# Patient Record
Sex: FEMALE | Race: WHITE | Employment: OTHER | URBAN - NONMETROPOLITAN AREA
[De-identification: names, ages, dates, MRNs, and addresses within clinical notes are randomized per-mention and may not be internally consistent; named-entity substitution may affect disease eponyms.]

---

## 2017-04-27 ENCOUNTER — HOSPITAL ENCOUNTER (EMERGENCY)
Age: 77
Discharge: HOME | End: 2017-04-27
Attending: EMERGENCY MEDICINE
Payer: MEDICARE

## 2017-04-27 ENCOUNTER — APPOINTMENT (OUTPATIENT)
Dept: GENERAL RADIOLOGY | Age: 77
End: 2017-04-27
Payer: MEDICARE

## 2017-04-27 VITALS
TEMPERATURE: 98.6 F | RESPIRATION RATE: 16 BRPM | DIASTOLIC BLOOD PRESSURE: 73 MMHG | SYSTOLIC BLOOD PRESSURE: 154 MMHG | OXYGEN SATURATION: 97 % | HEART RATE: 73 BPM

## 2017-04-27 DIAGNOSIS — S90.32XA CONTUSION OF LEFT FOOT, INITIAL ENCOUNTER: Primary | ICD-10-CM

## 2017-04-27 DIAGNOSIS — Z87.39 HISTORY OF GOUT: ICD-10-CM

## 2017-04-27 PROCEDURE — 99283 EMERGENCY DEPT VISIT LOW MDM: CPT

## 2017-04-27 PROCEDURE — 73630 X-RAY EXAM OF FOOT: CPT

## 2017-04-27 RX ORDER — PREDNISONE 20 MG/1
TABLET ORAL
Qty: 10 TABLET | Refills: 0 | Status: SHIPPED | OUTPATIENT
Start: 2017-04-27

## 2017-04-27 ASSESSMENT — PAIN SCALES - GENERAL
PAINLEVEL_OUTOF10: 9
PAINLEVEL_OUTOF10: 9

## 2017-04-27 ASSESSMENT — PAIN DESCRIPTION - ORIENTATION: ORIENTATION: LEFT

## 2017-04-27 ASSESSMENT — PAIN DESCRIPTION - LOCATION: LOCATION: FOOT

## 2017-05-01 ENCOUNTER — HOSPITAL ENCOUNTER (OUTPATIENT)
Age: 77
Discharge: HOME | End: 2017-05-01
Payer: MEDICARE

## 2017-05-01 LAB
SEDIMENTATION RATE, ERYTHROCYTE: 39 MM (ref 0–30)
URIC ACID: 7.5 MG/DL (ref 2.4–5.7)
WBC # BLD: 5.7 K/UL (ref 3.5–11)

## 2017-05-01 PROCEDURE — 84550 ASSAY OF BLOOD/URIC ACID: CPT

## 2017-05-01 PROCEDURE — 85048 AUTOMATED LEUKOCYTE COUNT: CPT

## 2017-05-01 PROCEDURE — 85651 RBC SED RATE NONAUTOMATED: CPT

## 2017-05-01 PROCEDURE — 36415 COLL VENOUS BLD VENIPUNCTURE: CPT

## 2017-10-05 ENCOUNTER — HOSPITAL ENCOUNTER (OUTPATIENT)
Dept: PHYSICAL THERAPY | Age: 77
Setting detail: THERAPIES SERIES
Discharge: HOME | End: 2017-10-05
Payer: MEDICARE

## 2017-10-05 PROCEDURE — 97162 PT EVAL MOD COMPLEX 30 MIN: CPT

## 2017-10-05 PROCEDURE — G8979 MOBILITY GOAL STATUS: HCPCS

## 2017-10-05 PROCEDURE — G8978 MOBILITY CURRENT STATUS: HCPCS

## 2017-10-05 ASSESSMENT — PAIN SCALES - GENERAL: PAINLEVEL_OUTOF10: 2

## 2017-10-06 NOTE — PROGRESS NOTES
Phone: [address]         Fax: [phone]                      Outpatient Physical Therapy                                                                      Evaluation    Date: 10/5/2017  Patient: Hollie Rick  : 1940  ACCT #: [de-identified]    Referring Practitioner: Dr. Shakila Mauro    Referral Date : 17    Diagnosis: Low back pain    Treatment Diagnosis: Low back pain  Onset Date: 17  PT Insurance Information: Medicare    Per Physician Order  Total # of Visits to Date: 1  No Show: 0  Canceled Appointment: 0     Subjective     Additional Pertinent Hx: Patient reports a 2 week onset of low back pain which she was unable to ambulate upright and required use of walker for support; she was unable to stand to complete meal prep or dishes etc.   She is currently able to walk without device but is still limited with standing for self-care, ADL's etc.   She denies radicular LE pain. No diagnostic testing completed. She uses heat and/or ice at home. She follows up with Dr. Violet Salazar in 6  weeks.   PMHx includes Ca/ lymphoma HTN, arthritis, pancreatitus  Pain Screening  Patient Currently in Pain: Yes  Pain Assessment  Pain Assessment: 0-10  Pain Level: 2 (increases to 5/10 with standing; up to 8/10 initially)  Social/Functional History  Lives With: Alone  IADL History  Active : Yes  Occupation: Retired  Leisure & Hobbies: Completes all daily household tasks as she lives alone but is currently limited with standing for meal prep etc; she drives and completes grocery shopping    Objective  Vision  Vision: Within Functional Limits  Hearing  Hearing: Within functional limits  Observation/Palpation  Posture: Fair (iliac hts symetrical and no apparent LLD)  Palpation: Moderate tenderness of LS/SI region  Spine  Lumbar: Limited 50%; able to forward flex to touch mid-thigh  Strength RLE  Strength RLE: WFL  Comment: No myotomal weakness  AROM RLE (degrees)  RLE AROM: WFL  RLE General AROM: Limited SKC to 90 deg due to lumbar tightness; hip mobility with tightness into hip IR  Strength LLE  Strength LLE: WFL  Comment: No myotomal weakness  AROM LLE (degrees)  LLE AROM : WFL  LLE General AROM: Limited SKC to 90 deg due to lumbar tightness; hip mobility with tight into hip IR       AROM RLE (degrees)  RLE AROM: WFL  RLE General AROM: Limited SKC to 90 deg due to lumbar tightness; hip mobility with tightness into hip IR  AROM LLE (degrees)  LLE AROM : WFL  LLE General AROM: Limited SKC to 90 deg due to lumbar tightness; hip mobility with tight into hip IR                          WB Status: ambulates without device with mild forward flexed posture;  limited toleranc to standing > 10 min             Assessment  Body structures, Functions, Activity limitations: Decreased functional mobility , Decreased ADL status, Decreased ROM  Assessment: Limited ability to complete daily household tasks due to progressive low back pain which limits standing tolerance  Prognosis: Good  Decision Making: Medium Complexity  History: 2 week onset of progressive low back pain  Exam: Oswestry = 24/40 or 60%    Clinical Presentation:  [] Stable/Uncomplicated  [x] Evolving [] Unstable/Unpredictable  The Following Comorbities will impact the patients progression and Plan of Care:   [] Diabetes    [] Stroke  [] Cardiac Disease/Pacemaker [x] Previous Orthopedic Injury/Surgery  [] Seizure Disorder   [] WB Restrictions  [] Obesity    [] Neuropathy     Activity Tolerance: Patient Tolerated treatment well  [x] Primary Impairment : Limited ability to complete daily household tasks due to progressive low back pain which limits standing tolerance    G Code:    [] Mobility         [] Carry        [] Body Position       [] Self Care      [] Other:   Functional Impairment Current:  [] 0%    [] 1-19% [] 20-39% [] 40-59% [x] 60-79%    [] 80-99% [] 100%    Functional Impairment Goal:  [] 0%    [] 1-19% [x] 20-39% [] 40-59% [] 60-79%    [] 80-99% [] 100%    G Code Functional Impairment determined by:[x] Clinical Judgment   [x] Outcome Measure:     Education: Patient Education: PT POC/goals;  HEP of [address]       Goals  Short term goals  Time Frame for Short term goals: 4 visits  Short term goal 1: Educate on home stretching exercises to assist with improved mobility    Long term goals  Time Frame for Long term goals : 10 visits -expires Nov 30 2017  Long term goal 1: Upgrade home program for core/hip strengthening and stabilizaition ex  Long term goal 2: decrease subjective lumbar pain to 2/10 so patient is able to stand to complete full meal prep  Long term goal 3: complete lifting up to 10# to complete daily household tasks  Long term goal 4: Oswestry score =<20% disability    Patient's Goal:    Get back to household activities     Timed Code Treatment Minutes: 0 Minutes  Total Treatment Time: 50     Time In: 1400  Time Out: 7[facility]  10/5/2017

## 2017-10-06 NOTE — PLAN OF CARE
[facility] [address] [place]       Phone: [phone]   Date: 10/5/2017                      Outpatient Physical Therapy  Fax: [phone]    ACCT #: [de-identified]                     Plan of Care  [facility]#: [id number]  Patient: Mervin Hobson  : 1940    Referring Practitioner: Dr. Macario Madrid    Referral Date : 17    Diagnosis: Low back pain  Onset Date: 17  Treatment Diagnosis: Low back pain    Assessment  Body structures, Functions, Activity limitations: Decreased functional mobility , Decreased ADL status, Decreased ROM  Assessment: Limited ability to complete daily household tasks due to progressive low back pain which limits standing tolerance  Prognosis: Good  [x] Primary Impairment :  Limited ability to complete daily household tasks due to progressive low back pain which limits standing tolerance     G Code:    [x] Mobility         [] Carry        [] Body Position       [] Self Care      [] Other:   Functional Impairment Current:  [] 0%    [] 1-19% [] 20-39% [] 40-59% [x] 60-79%    [] 80-99% [] 100%  Treatment Plan   Days: 2 times per week Weeks: 8 weeks    [] HP/CP     [] Electrical Stimulation   [x]  Therapeutic Exercise   []  Gait Training  [] Traction   [] Ultrasound                   []  Massage                        []  Work Conditioning   [] Aquatics  [] Back Education            []  Therapeutic Activity [x]  Manual Therapy  [x]  Patient Education/HEP []  [facility]   Goals  Short term goals  Time Frame for Short term goals: 4 visits  Short term goal 1: Educate on home stretching exercises to assist with improved mobility  Long term goals  Time Frame for Long term goals : 10 visits -expires 2017  Long term goal 1: Upgrade home program for core/hip strengthening and stabilizaition ex  Long term goal 2: decrease subjective lumbar pain to 2/10 so patient is able to stand to complete full meal prep  Long term goal 3: complete lifting up to 10# to complete daily

## 2017-10-10 ENCOUNTER — HOSPITAL ENCOUNTER (OUTPATIENT)
Dept: PHYSICAL THERAPY | Age: 77
Setting detail: THERAPIES SERIES
Discharge: HOME | End: 2017-10-10
Payer: MEDICARE

## 2017-10-10 PROCEDURE — 97110 THERAPEUTIC EXERCISES: CPT

## 2017-10-10 ASSESSMENT — PAIN SCALES - GENERAL: PAINLEVEL_OUTOF10: 4

## 2017-10-10 NOTE — PRE-CERTIFICATION NOTE
Medicare Cap     [] Physical Therapy  [] Speech Therapy  [] Occupational therapy    *PT and Speech caps combine      $5326 Cap limit < kx modifier needed < $5242 requires pre-cert     Patient Name: Olga Mendez  YOB: 1940     Date of [address] Name $$$ charge Daily Charge YTD   Total $   10/5/17 EVAL      10/10/17 Ex x 2

## 2017-10-12 ENCOUNTER — HOSPITAL ENCOUNTER (OUTPATIENT)
Dept: PHYSICAL THERAPY | Age: 77
Setting detail: THERAPIES SERIES
Discharge: HOME | End: 2017-10-12
Payer: MEDICARE

## 2017-10-12 PROCEDURE — 97110 THERAPEUTIC EXERCISES: CPT

## 2017-10-12 ASSESSMENT — PAIN SCALES - GENERAL: PAINLEVEL_OUTOF10: 4

## 2017-10-12 NOTE — PROGRESS NOTES
Phone: [address]      Fax: [phone]                            Outpatient Physical Therapy                                                                            Daily Note    Date: 10/12/2017  Patient Name: Tu Villaseñor        MRN: [id number]   ACCT#:  [de-identified]  : 1940  (77 y.o.)    Referring Practitioner: Dr. Mireille Gibbs    Referral Date : 17    Diagnosis: Low back pain  Treatment Diagnosis: Low back pain    Onset Date: 17  PT Insurance Information: Medicare    Per Physician Order  Total # of Visits to Date: 3  No Show: 0  Canceled Appointment: 0    Pre-Treatment Pain:  /10     Assessment  Assessment: Patient states standing tolerance to complete dishes approximately 3 minutes.  Patient having c/o pain in neck and between shoulder blades  Chart Reviewed: Yes    Plan  Plan: Continue with current plan    Exercises/Modalities/Manual:  See DocFlow Sheet    Education:   Patient Education: PT POC/goals;  HEP of [address]       Goals  (Total # of Visits to Date: 3)   Short Term Goals - Time Frame for Short term goals: 4 visits  Short term goal 1: Educate on home stretching exercises to assist with improved mobility                Long Term Goals - Time Frame for Long term goals : 10 visits -expires 2017  Long term goal 1: Upgrade home program for core/hip strengthening and stabilizaition ex  Long term goal 2: decrease subjective lumbar pain to 2/10 so patient is able to stand to complete full meal prep  Long term goal 3: complete lifting up to 10# to complete daily household tasks  Long term goal 4: Oswestry score =<20% disability       Post Treatment Pain:  5/10      Time In: 1508  Time Out: 1545  Timed Code Treatment Minutes: 37 Minutes  Total Treatment Time: [address] Number: PTA    Date: 10/12/2017

## 2017-10-12 NOTE — PRE-CERTIFICATION NOTE
Medicare Cap     [] Physical Therapy  [] Speech Therapy  [] Occupational therapy    *PT and Speech caps combine      $8383 Cap limit < kx modifier needed < $8468 requires pre-cert     Patient Name: Rick Merrill  YOB: 1940     Date of [address] Name $$$ charge Daily Charge YTD   Total $   10/5/17 EVAL   78.94   10/10/17 Ex x 2 31.69 x2 63.38 142.32    Ex x2

## 2017-10-12 NOTE — PRE-CERTIFICATION NOTE
Medicare Cap     [] Physical Therapy  [] Speech Therapy  [] Occupational therapy    *PT and Speech caps combine      $0563 Cap limit < kx modifier needed < $7402 requires pre-cert     Patient Name: Celia Ladd  YOB: 1940     Date of [address] Name $$$ charge Daily Charge YTD   Total $   10/5/17 EVAL   78.94   10/10/17 Ex x 2 31.69 x2 63.38 142.32   10/12/17 Ex x2 31.69 x2 63.38 205.70

## 2017-10-19 ENCOUNTER — HOSPITAL ENCOUNTER (OUTPATIENT)
Dept: PHYSICAL THERAPY | Age: 77
Setting detail: THERAPIES SERIES
Discharge: HOME | End: 2017-10-19
Payer: MEDICARE

## 2017-10-19 PROCEDURE — 97110 THERAPEUTIC EXERCISES: CPT

## 2017-10-19 ASSESSMENT — PAIN SCALES - GENERAL: PAINLEVEL_OUTOF10: 4

## 2017-10-19 NOTE — PRE-CERTIFICATION NOTE
Medicare Cap     [] Physical Therapy  [] Speech Therapy  [] Occupational therapy    *PT and Speech caps combine      $7241 Cap limit < kx modifier needed < $8569 requires pre-cert     Patient Name: Maria Esther Lovell  YOB: 1940     Date of [address] Name $$$ charge Daily Charge YTD   Total $   10/5/17 EVAL   78.94   10/10/17 Ex x 2 31.69 x2 63.38 142.32   10/12/17 Ex x2 31.69 x2 63.38 205.70   10/19/17 Ex x 3

## 2017-10-23 ENCOUNTER — HOSPITAL ENCOUNTER (OUTPATIENT)
Dept: PHYSICAL THERAPY | Age: 77
Setting detail: THERAPIES SERIES
Discharge: HOME | End: 2017-10-23
Payer: MEDICARE

## 2017-10-26 ENCOUNTER — HOSPITAL ENCOUNTER (OUTPATIENT)
Dept: PHYSICAL THERAPY | Age: 77
Setting detail: THERAPIES SERIES
Discharge: HOME | End: 2017-10-26
Payer: MEDICARE

## 2017-10-26 PROCEDURE — 97110 THERAPEUTIC EXERCISES: CPT

## 2017-10-26 ASSESSMENT — PAIN SCALES - GENERAL: PAINLEVEL_OUTOF10: 3

## 2017-10-26 NOTE — PRE-CERTIFICATION NOTE
Medicare Cap     [] Physical Therapy  [] Speech Therapy  [] Occupational therapy    *PT and Speech caps combine      $7968 Cap limit < kx modifier needed < $0521 requires pre-cert     Patient Name: Dequan Woods  YOB: 1940     Date of [address] Name $$$ charge Daily Charge YTD   Total $   10/5/17 EVAL   78.94   10/10/17 Ex x 2 31.69 x2 63.38 142.32   10/12/17 Ex x2 31.69 x2 63.38 205.70   10/19/17 Ex x 3 31.69 x3 78.94 284.64   10/26/17 Ex x 3

## 2017-11-01 ENCOUNTER — HOSPITAL ENCOUNTER (OUTPATIENT)
Dept: PHYSICAL THERAPY | Age: 77
Setting detail: THERAPIES SERIES
Discharge: HOME | End: 2017-11-01
Payer: MEDICARE

## 2017-11-01 PROCEDURE — 97110 THERAPEUTIC EXERCISES: CPT

## 2017-11-01 ASSESSMENT — PAIN SCALES - GENERAL: PAINLEVEL_OUTOF10: 3

## 2017-11-01 NOTE — PROGRESS NOTES
Phone: [address]      Fax: [phone]                            Outpatient Physical Therapy                                                                            Daily Note    Date: 2017  Patient Name: Eden Wakefield        MRN: [id number]   ACCT#:  [de-identified]  : 1940  (77 y.o.)    Referring Practitioner: Dr. Jeannette Saldaña    Referral Date : 17    Diagnosis: Low back pain  Treatment Diagnosis: Low back pain    Onset Date: 17  PT Insurance Information: Medicare    Per Physician Order  Total # of Visits to Date: 6  No Show: 0  Canceled Appointment: 0    Pre-Treatment Pain:  3/10     Assessment  Assessment: Lumbar pain rated 3/10 this morning; thoracic pain 5-6/10 throughout. .  Tightness noted of left mid scapular parapsinals and UT as well as left gluteal/iliac crest region. Patient compliant with HEP - added sidelying clamshells. Plan to continue x 2 visits and then assess for continued PT. Return MD appt 11/15/17.   Chart Reviewed: Yes    Plan  Plan: Continue with current plan    Exercises/Modalities/Manual:  See DocFlow Sheet    Education: zak  Patient Education: PT POC/goals;  HEP of [address]       Goals  (Total # of Visits to Date: 6)   Short Term Goals - Time Frame for Short term goals: 4 visits  Short term goal 1: Educate on home stretching exercises to assist with improved mobility                Long Term Goals - Time Frame for Long term goals : 10 visits -expires 2017  Long term goal 1: Upgrade home program for core/hip strengthening and stabilizaition ex  Long term goal 2: decrease subjective lumbar pain to 2/10 so patient is able to stand to complete full meal prep  Long term goal 3: complete lifting up to 10# to complete daily household tasks  Long term goal 4: Oswestry score =<20% disability       Post Treatment Pain:  3/10    Time In: 0900    Time Out : 0945        Timed Code Treatment Minutes: 40 Minutes  Total

## 2017-11-01 NOTE — PRE-CERTIFICATION NOTE
Medicare Cap     [] Physical Therapy  [] Speech Therapy  [] Occupational therapy    *PT and Speech caps combine      $1940 Cap limit < kx modifier needed < $5172 requires pre-cert     Patient Name: Angie Davila  YOB: 1940     Date of [address] Name $$$ charge Daily Charge YTD   Total $   10/5/17 EVAL   78.94   10/10/17 Ex x 2 31.69 x2 63.38 142.32   10/12/17 Ex x2 31.69 x2 63.38 205.70   10/19/17 Ex x 3 31.69 x3 95.07 300.77   10/26/17 Ex x 3 31.69 x3 95.07 395.84   11/1/17 Ex x 3

## 2017-11-03 ENCOUNTER — HOSPITAL ENCOUNTER (OUTPATIENT)
Dept: PHYSICAL THERAPY | Age: 77
Setting detail: THERAPIES SERIES
Discharge: HOME | End: 2017-11-03
Payer: MEDICARE

## 2017-11-03 PROCEDURE — 97110 THERAPEUTIC EXERCISES: CPT

## 2017-11-03 ASSESSMENT — PAIN SCALES - GENERAL: PAINLEVEL_OUTOF10: 3

## 2017-11-03 NOTE — PRE-CERTIFICATION NOTE
Medicare Cap     [] Physical Therapy  [] Speech Therapy  [] Occupational therapy    *PT and Speech caps combine      $1940 Cap limit < kx modifier needed < $3553 requires pre-cert     Patient Name: Jaguar Francisco  YOB: 1940     Date of [address] Name $$$ charge Daily Charge YTD   Total $   10/5/17 EVAL   78.94   10/10/17 Ex x 2 31.69 x2 63.38 142.32   10/12/17 Ex x2 31.69 x2 63.38 205.70   10/19/17 Ex x 3 31.69 x3 95.07 300.77   10/26/17 Ex x 3 31.69 x3 95.07 395.84   11/1/17 Ex x 3      11/3/17 Ex x 3

## 2017-11-06 ENCOUNTER — HOSPITAL ENCOUNTER (OUTPATIENT)
Dept: PHYSICAL THERAPY | Age: 77
Setting detail: THERAPIES SERIES
Discharge: HOME | End: 2017-11-06
Payer: MEDICARE

## 2017-11-06 PROCEDURE — 97110 THERAPEUTIC EXERCISES: CPT

## 2017-11-06 ASSESSMENT — PAIN SCALES - GENERAL: PAINLEVEL_OUTOF10: 2

## 2017-11-09 ENCOUNTER — HOSPITAL ENCOUNTER (OUTPATIENT)
Dept: PHYSICAL THERAPY | Age: 77
Setting detail: THERAPIES SERIES
Discharge: HOME | End: 2017-11-09
Payer: MEDICARE

## 2017-11-09 PROCEDURE — 97140 MANUAL THERAPY 1/> REGIONS: CPT

## 2017-11-09 PROCEDURE — 97110 THERAPEUTIC EXERCISES: CPT

## 2017-11-09 ASSESSMENT — PAIN SCALES - GENERAL: PAINLEVEL_OUTOF10: 2

## 2017-11-14 ENCOUNTER — HOSPITAL ENCOUNTER (OUTPATIENT)
Dept: PHYSICAL THERAPY | Age: 77
Setting detail: THERAPIES SERIES
Discharge: HOME | End: 2017-11-14
Payer: MEDICARE

## 2017-11-14 NOTE — PROGRESS NOTES
HOSP GENERAL [facility]  Rehab and Wellness    Date: 2017  Patient Name: Maria Esther Lovell        : 1940       [x] Pt No Showed Appt           [] Pt Cancelled Appt:  [] No Reason Given   [] Sick/ill   [] Other:      Brian Hernández Date: 2017

## 2017-11-17 ENCOUNTER — HOSPITAL ENCOUNTER (OUTPATIENT)
Dept: PHYSICAL THERAPY | Age: 77
Setting detail: THERAPIES SERIES
Discharge: HOME | End: 2017-11-17
Payer: MEDICARE

## 2017-11-17 PROCEDURE — G8979 MOBILITY GOAL STATUS: HCPCS

## 2017-11-17 PROCEDURE — G8980 MOBILITY D/C STATUS: HCPCS

## 2017-11-17 PROCEDURE — 97110 THERAPEUTIC EXERCISES: CPT

## 2017-11-17 ASSESSMENT — PAIN SCALES - GENERAL: PAINLEVEL_OUTOF10: 2

## 2017-11-17 NOTE — PROGRESS NOTES
Phone: [address]      Fax: [phone]                            Outpatient Physical Therapy                                                                            Daily Note    Date: 2017  Patient Name: Olga Mendez        MRN: [id number]   ACCT#:  [de-identified]  : 1940  (77 y.o.)    Referring Practitioner: Dr. Edwardo Lawrence    Referral Date : 17    Diagnosis: Low back pain  Treatment Diagnosis: Low back pain    Onset Date: 17  PT Insurance Information: Medicare    Per Physician Order  Total # of Visits to Date: 10  No Show: 1  Canceled Appointment: 0    Pre-Treatment Pain:  2/10     Assessment  Assessment: Patient has completed 10 treatment sessions consisting of stretching, strengthening and manual therapy due to low back pain. Overall, she notes reduction of back pain to 2/10 however remains limited with her tolerance to standing and walking. She has also notes left scapular discomfort with activity. Trunk mobility is WFL. She has been educated on a home exercise program.    Plan to discharge from further PT and allow patient to continue on an indenpendent basis.   Chart Reviewed: Yes    Plan  Plan: Discharge    Exercises/Modalities/Manual:  See DocFlow Sheet    Education: Reviewed HEP          Goals  (Total # of Visits to Date: 8)   Short Term Goals - Time Frame for Short term goals: 4 visits  Short term goal 1: Educate on home stretching exercises to assist with improved mobility                Long Term Goals - Time Frame for Long term goals : 10 visits -expires 2017  Long term goal 1: Upgrade home program for core/hip strengthening and stabilizaition ex- MET  Long term goal 2: decrease subjective lumbar pain to 2/10 so patient is able to stand to complete full meal prep-Partially MET  Long term goal 3: complete lifting up to 10# to complete daily household tasks- NOT MET  Long term goal 4: Oswestry score =<20% disability

## 2017-11-17 NOTE — DISCHARGE SUMMARY
Phone: [address]             Fax: [phone]                            Outpatient Physical Therapy                                                                    Discharge Summary    Patient: Dimitry Franco  : 1940  ACCT #: [de-identified]   Referring Practitioner: Dr. Gabriel Tavares      Diagnosis: Low back pain  Date Treatment Initiated: 10/5/17  Date of Last Treatment: 17  Onset Date: 17  PT Insurance Information: Medicare  Total # of Visits to Date: 10  Plan of Care/Certification Expiration Date: 17  No Show: 1  Treatment Received  [x] HP/CP [] Electrical Stimulation   [x]  Therapeutic Exercise   []  Gait Training  [] Traction   [] Ultrasound                   []  Massage                        []  Work Conditioning   [] Aquatics  [] Back Education            [x]  Patient Education/HEP [x]  Manual Therapy  Assessment: Patient has completed 10 treatment sessions consisting of stretching, strengthening and manual therapy due to low back pain. Overall, she notes reduction of back pain to 2/10 however remains limited with her tolerance to standing and walking. She has also notes left scapular discomfort with activity. Trunk mobility is WFL. She has been educated on a home exercise program.    Plan to discharge from further PT and allow patient to continue on an indenpendent basis. Oswestry disability score improved from 60% to 51%.   [x] Primary Impairment :  Limited ability to complete daily household tasks due to progressive low back pain which limits standing tolerance     G Code:    [x] Mobility         [] Carry        [] Body Position       [] Self Care      [] Other:   Functional Impairment Current:  [] 0%    [] 1-19% [] 20-39% [x] 40-59% [] 60-79%    [] 80-99% [] 100%  G Code Functional Impairment determined by:[x] Clinical Judgment   [x] Outcome Measure:   Reason for Discharge  [] Poor Follow Through [] Completion of Prescribed Sessions    [x]  Optimal Function Achieved   []  Patient Discharged Self  [] Goals Achieved    Comments:   Thank you for this referral      ARTUR WRIGHT, PT   Date: 11/17/2017

## 2018-02-21 ENCOUNTER — HOSPITAL ENCOUNTER (OUTPATIENT)
Age: 78
Discharge: HOME | End: 2018-02-21
Payer: MEDICARE

## 2018-02-21 LAB — URIC ACID: 8.5 MG/DL (ref 2.4–5.7)

## 2018-02-21 PROCEDURE — 36415 COLL VENOUS BLD VENIPUNCTURE: CPT

## 2018-02-21 PROCEDURE — 84550 ASSAY OF BLOOD/URIC ACID: CPT

## 2021-03-11 ENCOUNTER — HOSPITAL ENCOUNTER (OUTPATIENT)
Age: 81
Discharge: HOME | End: 2021-03-11
Payer: MEDICARE

## 2021-03-11 PROCEDURE — 93005 ELECTROCARDIOGRAM TRACING: CPT | Performed by: FAMILY MEDICINE

## 2021-03-11 NOTE — PROGRESS NOTES
Dr. Ish Hernadez office contacted by this writer to discuss patient EKG. EKG was faxed to the office. Verbal given patient was ok to leave, they were setting patient up with cardiologist at [facility]. This writer advised patient if they became S. O.B or starting feeling any discomfort at all to [facility].

## 2021-03-12 LAB
EKG ATRIAL RATE: 110 BPM
EKG Q-T INTERVAL: 380 MS
EKG QRS DURATION: 140 MS
EKG QTC CALCULATION (BAZETT): 504 MS
EKG R AXIS: 20 DEGREES
EKG T AXIS: 165 DEGREES
EKG VENTRICULAR RATE: 106 BPM

## 2021-03-12 PROCEDURE — 93010 ELECTROCARDIOGRAM REPORT: CPT | Performed by: INTERNAL MEDICINE

## 2021-09-20 ENCOUNTER — HOSPITAL ENCOUNTER (OUTPATIENT)
Dept: PHYSICAL THERAPY | Age: 81
Setting detail: THERAPIES SERIES
Discharge: HOME | End: 2021-09-20
Payer: MEDICARE

## 2021-09-20 PROCEDURE — 97140 MANUAL THERAPY 1/> REGIONS: CPT

## 2021-09-20 PROCEDURE — 97163 PT EVAL HIGH COMPLEX 45 MIN: CPT

## 2021-09-20 ASSESSMENT — PAIN SCALES - GENERAL: PAINLEVEL_OUTOF10: 5

## 2021-09-20 ASSESSMENT — PAIN DESCRIPTION - LOCATION: LOCATION: NECK;SHOULDER;BACK

## 2021-09-20 NOTE — PROGRESS NOTES
dizziness/ vertigo. Completed manual therapy and therex per Doc Flow. Plan for therex, education on HEP, manual therapy,  mechanical cervical traction. Prognosis: Good  Decision Making: High Complexity  History: complex  Exam: Neck Disability 19/50    Clinical Presentation:  Unstable/Unpredictable  The Following Comorbities will impact the patients progression and Plan of Care:   Cardiac Disease/Pacemaker, Obesity and Previous Orthopedic Injury/Surgery       Education: On POC and HEP        Goals  Short term goals  Time Frame for Short term goals: 7  Short term goal 1: Patient to be independent with HEP  Short term goal 2: Decrease pain 4/10 neck and shld at worst x3 days  Short term goal 3: Increase cervical ROM forward flexion 40 degrees  Short term goal 4:  Increase cervical ROM rotation 45 degrees bilaterally    Long term goals  Time Frame for Long term goals : 10  Long term goal 1: Decrease pain 2/10 neck and shld at worst x3 days  Long term goal 2: Improve functional activities with Neck disability Index score <10/50  Long term goal 3: Patient to reports decrease dizziness/ vertigo by 50%    Patient's Goal:    Be rid of vertigo/ dizziness and be able to turn head without pain    Timed Code Treatment Minutes: 15 Minutes  Total Treatment Time: 45     Time In: 13:56  Time Out: 14:41    Юлия Robles, PT Date: 9/20/2021

## 2021-09-20 NOTE — PLAN OF CARE
[facility] [address] [place]       Phone: [phone]   Date: 2021                      Outpatient Physical Therapy  Fax: [phone]    ACCT #: [de-identified]                     Plan of Care  [facility]#: [id number]  Patient: Kwame Vasquez  : 1940    Referring Practitioner: Dr. Carrie Wadsworth    Referral Date : 09/15/21    Diagnosis: Cervicalgia  Onset Date: 09/15/21 (Referral)  Treatment Diagnosis: Neck pain    Assessment  Body structures, Functions, Activity limitations: Decreased ADL status, Decreased ROM, Decreased strength, Increased pain, Decreased posture  Assessment: Patient with chronic neck and back pain with acute flare up of neck with dizziness/ vertigo. Completed manual therapy and therex per Doc Flow. Plan for therex, education on HEP, manual therapy,  mechanical cervical traction. Prognosis: Good    Treatment Plan   Days: 2 times per week Weeks: 5 weeks Total # of Visits Approved: 10    Patient Education/HEP, Therapeutic Exercise, Manual Therapy: Myofacial Release/Cupping, Manual Therapy: Mobilization/Manipulation, Traction and HP/CP     Goals  Short term goals  Time Frame for Short term goals: 7  Short term goal 1: Patient to be independent with HEP  Short term goal 2: Decrease pain 4/10 neck and shld at worst x3 days  Short term goal 3: Increase cervical ROM forward flexion 40 degrees  Short term goal 4:  Increase cervical ROM rotation 45 degrees bilaterally  Long term goals  Time Frame for Long term goals : 10  Long term goal 1: Decrease pain 2/10 neck and shld at worst x3 days  Long term goal 2: Improve functional activities with Neck disability Index score <10/50  Long term goal 3: Patient to reports decrease dizziness/ vertigo by 50%     Mc Abdi, PT   Date: 2021    ______________________________________ Date: 2021  Physician Signature  By signing above or cosigning electronically, I have reviewed this Plan of Care and certify a need for medically necessary rehabilitation services.

## 2021-09-27 ENCOUNTER — HOSPITAL ENCOUNTER (OUTPATIENT)
Dept: PHYSICAL THERAPY | Age: 81
Setting detail: THERAPIES SERIES
Discharge: HOME | End: 2021-09-27
Payer: MEDICARE

## 2021-09-27 PROCEDURE — 97110 THERAPEUTIC EXERCISES: CPT

## 2021-09-27 PROCEDURE — 97140 MANUAL THERAPY 1/> REGIONS: CPT

## 2021-09-27 ASSESSMENT — PAIN SCALES - GENERAL: PAINLEVEL_OUTOF10: 4

## 2021-09-27 NOTE — PRE-CERTIFICATION NOTE
Medicare Cap     [] Physical Therapy  [] Speech Therapy  [] Occupational therapy    *PT and Speech caps combine      $6112 Cap limit < kx modifier needed < $2129 requires pre-cert     Patient Name: Anna Osullivan  YOB: 1940     Date of [address] Name $$$ charge Daily Charge YTD   Total $   9/20/21 Eval, Ex 117.78 117.78 117.78   9/27/21 Man, x 2, Ex 24.76 x 2, 26.88 76.40 194.18

## 2021-09-27 NOTE — PROGRESS NOTES
Phone: [address]      Fax: [phone]                            Outpatient Physical Therapy                                                                            Daily Note    Date: 2021  Patient Name: Yoni Bahena        MRN: [id number]   ACCT#:  [de-identified]  : 1940  (79 y.o.)    Referring Practitioner: Dr. Sosa Zamarripa    Referral Date : 09/15/21    Diagnosis: Cervicalgia  Treatment Diagnosis: Neck pain    Onset Date: 09/15/21 (Referral)  PT Insurance Information: [facility], [employer]  Total # of Visits Approved: 10 Per Physician Order  Total # of Visits to Date: 2  No Show: 0  Canceled Appointment: 0  Plan of Care/Certification Expiration Date: 10/29/21    Pre-Treatment Pain:  4/10     Assessment  Assessment: Pt 8 min late for appt. Initiated ex and manual therapy as outlined. Pt notes some increased soreness after session. Will monitor tolerance. Chart Reviewed: Yes    Plan  Plan: Continue with current plan    Exercises/Modalities/Manual:  See DocFlow Sheet          Goals  (Total # of Visits to Date: 2)   Short Term Goals - Time Frame for Short term goals: 7  Short term goal 1: Patient to be independent with HEP  Short term goal 2: Decrease pain 4/10 neck and shld at worst x3 days  Short term goal 3: Increase cervical ROM forward flexion 40 degrees  Short term goal 4:  Increase cervical ROM rotation 45 degrees bilaterally       Long Term Goals - Time Frame for Long term goals : 10  Long term goal 1: Decrease pain 2/10 neck and shld at worst x3 days  Long term goal 2: Improve functional activities with Neck disability Index score <10/50  Long term goal 3: Patient to reports decrease dizziness/ vertigo by 50%          Post Treatment Pain:  5/10    Time In: 1353    Time Out : 1435        Timed Code Treatment Minutes: 42 Minutes  Total Treatment Time: 42 Minutes    Jan Mae PTA Date: 2021

## 2021-09-29 ENCOUNTER — HOSPITAL ENCOUNTER (OUTPATIENT)
Dept: PHYSICAL THERAPY | Age: 81
Setting detail: THERAPIES SERIES
Discharge: HOME | End: 2021-09-29
Payer: MEDICARE

## 2021-09-29 PROCEDURE — 97140 MANUAL THERAPY 1/> REGIONS: CPT

## 2021-09-29 PROCEDURE — 97110 THERAPEUTIC EXERCISES: CPT

## 2021-09-29 ASSESSMENT — PAIN SCALES - GENERAL: PAINLEVEL_OUTOF10: 4

## 2021-09-29 NOTE — PRE-CERTIFICATION NOTE
Medicare Cap     [] Physical Therapy  [] Speech Therapy  [] Occupational therapy    *PT and Speech caps combine      $4977 Cap limit < kx modifier needed < $5925 requires pre-cert     Patient Name: Leila Nunez  YOB: 1940     Date of M[address] Name $$$ charge Daily Charge YTD   Total $   9/20/21 Eval, Ex 117.78 117.78 117.78   9/27/21 Man, x 2, Ex 24.76 x 2, 26.88 76.40 194.18   9/29/21 Ex x 1, man x 2 24.76 x 2, 26.88 76.40 270.58

## 2021-09-29 NOTE — PRE-CERTIFICATION NOTE
Medicare Cap     [] Physical Therapy  [] Speech Therapy  [] Occupational therapy    *PT and Speech caps combine      $8260 Cap limit < kx modifier needed < $9232 requires pre-cert     Patient Name: Quinten Collier  YOB: 1940     Date of Mö[address] 63 Name $$$ charge Daily Charge YTD   Total $   9/20/21 Eval, Ex 117.78 117.78 117.78   9/27/21 Man, x 2, Ex 24.76 x 2, 26.88 76.40 194.18   9/29/21 Ex x 1, man x 2

## 2021-09-29 NOTE — PROGRESS NOTES
Phone: [address]      Fax: [phone]                            Outpatient Physical Therapy                                                                            Daily Note    Date: 2021  Patient Name: Anna Osullivan        MRN: [id number]   ACCT#:  [de-identified]  : 1940  (79 y.o.)    Referring Practitioner: Dr. Chris Bahena    Referral Date : 09/15/21    Diagnosis: Cervicalgia  Treatment Diagnosis: Neck pain    Onset Date: 09/15/21 (Referral)  PT Insurance Information: [facility], [employer]  Total # of Visits Approved: 10 Per Physician Order  Total # of Visits to Date: 3  No Show: 0  Canceled Appointment: 0  Plan of Care/Certification Expiration Date: 10/29/21    Pre-Treatment Pain:  4/10     Assessment  Assessment: Pt reports she is making progress. Rates pain 4/10. She does note that after last visit she had increased soreness. Performed ex as outlined for postural strength and flexibility. Manual therapy for cervical soft tissue release and distraction for pain and flexibility. Pt notes feeling sore after session. May consider modifying to traction. Chart Reviewed: Yes    Plan  Plan: Continue with current plan    Exercises/Modalities/Manual:  See DocFlow Sheet      Goals  (Total # of Visits to Date: 3)   Short Term Goals - Time Frame for Short term goals: 7  Short term goal 1: Patient to be independent with HEP  Short term goal 2: Decrease pain 4/10 neck and shld at worst x3 days  Short term goal 3: Increase cervical ROM forward flexion 40 degrees  Short term goal 4:  Increase cervical ROM rotation 45 degrees bilaterally       Long Term Goals - Time Frame for Long term goals : 10  Long term goal 1: Decrease pain 2/10 neck and shld at worst x3 days  Long term goal 2: Improve functional activities with Neck disability Index score <10/50  Long term goal 3: Patient to reports decrease dizziness/ vertigo by 50%          Post Treatment Pain:  5/10    Time In: 1345    Time Out : 1425        Timed Code Treatment Minutes: 40 Minutes  Total Treatment Time: 40 Minutes    Antonino Mae  PTA   Date: 9/29/2021

## 2021-10-04 ENCOUNTER — HOSPITAL ENCOUNTER (OUTPATIENT)
Dept: PHYSICAL THERAPY | Age: 81
Setting detail: THERAPIES SERIES
Discharge: HOME | End: 2021-10-04
Payer: MEDICARE

## 2021-10-04 PROCEDURE — 97110 THERAPEUTIC EXERCISES: CPT

## 2021-10-04 PROCEDURE — 97012 MECHANICAL TRACTION THERAPY: CPT

## 2021-10-04 ASSESSMENT — PAIN SCALES - GENERAL: PAINLEVEL_OUTOF10: 4

## 2021-10-04 NOTE — PRE-CERTIFICATION NOTE
Medicare Cap     [] Physical Therapy  [] Speech Therapy  [] Occupational therapy    *PT and Speech caps combine      $1234 Cap limit < kx modifier needed < $4785 requires pre-cert     Patient Name: Octavio Martinez  YOB: 1940     Date of M[address] 63 Name $$$ charge Daily Charge YTD   Total $   9/20/21 Eval, Ex 117.78 117.78 117.78   9/27/21 Man, x 2, Ex 24.76 x 2, 26.88 76.40 194.18   9/29/21 Ex x 1, man x 2 24.76 x 2, 26.88 76.40 270.58   10/4/21 Ex x 1 , tx 26.88, 13.35 40.23 310.81

## 2021-10-04 NOTE — PROGRESS NOTES
Phone: [address]      Fax: [phone]                            Outpatient Physical Therapy                                                                            Daily Note    Date: 10/4/2021  Patient Name: Elie Nicole        MRN: [id number]   ACCT#:  [de-identified]  : 1940  (79 y.o.)    Referring Practitioner: Dr. Dorene Smiley    Referral Date : 09/15/21    Diagnosis: Cervicalgia  Treatment Diagnosis: Neck pain    Onset Date: 09/15/21 (Referral)  PT Insurance Information: [facility], [employer]  Total # of Visits Approved: 10 Per Physician Order  Total # of Visits to Date: 4  No Show: 0  Canceled Appointment: 0  Plan of Care/Certification Expiration Date: 10/29/21    Pre-Treatment Pain:  4/10     Assessment  Assessment: Pt reports pain after last visit for more than 1 day. Pain today 4/10. Modified to ex and traction as outlined  to decrease  compression and improve tolerance to treatment. Good initial mecca to traction, will monitor. Chart Reviewed: Yes    Plan  Plan: Continue with current plan    Exercises/Modalities/Manual:  See DocFlow Sheet              Goals  (Total # of Visits to Date: 4)   Short Term Goals - Time Frame for Short term goals: 7  Short term goal 1: Patient to be independent with HEP  Short term goal 2: Decrease pain 4/10 neck and shld at worst x3 days  Short term goal 3: Increase cervical ROM forward flexion 40 degrees  Short term goal 4:  Increase cervical ROM rotation 45 degrees bilaterally       Long Term Goals - Time Frame for Long term goals : 10  Long term goal 1: Decrease pain 2/10 neck and shld at worst x3 days  Long term goal 2: Improve functional activities with Neck disability Index score <10/50  Long term goal 3: Patient to reports decrease dizziness/ vertigo by 50%          Post Treatment Pain:  4.5/10    Time In: 1330    Time Out : 1410        Timed Code Treatment Minutes: 20 Minutes  Total Treatment Time: 35 Minutes    Don Lank Carmelita PTA    Date: 10/4/2021

## 2021-10-07 ENCOUNTER — APPOINTMENT (OUTPATIENT)
Dept: PHYSICAL THERAPY | Age: 81
End: 2021-10-07
Payer: MEDICARE

## 2021-10-08 ENCOUNTER — HOSPITAL ENCOUNTER (OUTPATIENT)
Dept: PHYSICAL THERAPY | Age: 81
Setting detail: THERAPIES SERIES
Discharge: HOME | End: 2021-10-08
Payer: MEDICARE

## 2021-10-08 PROCEDURE — 97012 MECHANICAL TRACTION THERAPY: CPT

## 2021-10-08 PROCEDURE — 97110 THERAPEUTIC EXERCISES: CPT

## 2021-10-08 ASSESSMENT — PAIN SCALES - GENERAL: PAINLEVEL_OUTOF10: 6

## 2021-10-08 NOTE — PROGRESS NOTES
Phone: [address]      Fax: [phone]                            Outpatient Physical Therapy                                                                            Daily Note    Date: 10/8/2021  Patient Name: Anastasia Douglass        MRN: [id number]   ACCT#:  [de-identified]  : 1940  (79 y.o.)    Referring Practitioner: Dr. Mando Bledsoe    Referral Date : 09/15/21    Diagnosis: Cervicalgia  Treatment Diagnosis: Neck pain    Onset Date: 09/15/21 (Referral)  PT Insurance Information: [facility], [employer]  Total # of Visits Approved: 10 Per Physician Order  Total # of Visits to Date: 5  No Show: 0  Canceled Appointment: 0  Plan of Care/Certification Expiration Date: 10/29/21    Pre-Treatment Pain:  6/10     Assessment  Assessment: Patient arrives stating pain 6/10. Pt reports good tolerance to cervical traction reporting noticing relief for 3-4 days following. Continued with exercise and traction this session. Pt noted improvement with pain post traction this date. Plan to continue to monitor. Chart Reviewed: Yes    Plan  Plan: Continue with current plan    Exercises/Modalities/Manual:  See DocFlow Sheet    Education:           Goals  (Total # of Visits to Date: 5)   Short Term Goals - Time Frame for Short term goals: 7  Short term goal 1: Patient to be independent with HEP  Short term goal 2: Decrease pain 4/10 neck and shld at worst x3 days  Short term goal 3: Increase cervical ROM forward flexion 40 degrees  Short term goal 4:  Increase cervical ROM rotation 45 degrees bilaterally       Long Term Goals - Time Frame for Long term goals : 10  Long term goal 1: Decrease pain 2/10 neck and shld at worst x3 days  Long term goal 2: Improve functional activities with Neck disability Index score <10/50  Long term goal 3: Patient to reports decrease dizziness/ vertigo by 50%          Post Treatment Pain:  3/10      Time In: 1246  Time Out: 1319  Timed Code Treatment Minutes: 23 Minutes  Total Treatment Time: 35 Minutes    Antonette Louis, PTA     Date: 10/8/2021

## 2021-10-08 NOTE — PRE-CERTIFICATION NOTE
Medicare Cap     [] Physical Therapy  [] Speech Therapy  [] Occupational therapy    *PT and Speech caps combine      $2825 Cap limit < kx modifier needed < $6722 requires pre-cert     Patient Name: Rayne Snell  YOB: 1940     Date of M[address] Name $$$ charge Daily Charge YTD   Total $   9/20/21 Eval, Ex 117.78 117.78 117.78   9/27/21 Man, x 2, Ex 24.76 x 2, 26.88 76.40 194.18   9/29/21 Ex x 1, man x 2 24.76 x 2, 26.88 76.40 270.58   10/4/21 Ex x 1 , tx 26.88, 13.35 40.23 310.81   10/7/21 Ex x1 traciion x1

## 2021-10-08 NOTE — PRE-CERTIFICATION NOTE
Medicare Cap     [] Physical Therapy  [] Speech Therapy  [] Occupational therapy    *PT and Speech caps combine      $9944 Cap limit < kx modifier needed < $1639 requires pre-cert     Patient Name: Pk Villarreal  YOB: 1940     Date of M[address] Name $$$ charge Daily Charge YTD   Total $   9/20/21 Eval, Ex 117.78 117.78 117.78   9/27/21 Man, x 2, Ex 24.76 x 2, 26.88 76.40 194.18   9/29/21 Ex x 1, man x 2 24.76 x 2, 26.88 76.40 270.58   10/4/21 Ex x 1 , tx 26.88, 13.35 40.23 310.81   10/7/21 Ex x1 traction x1 26.88, 13.35 40.23 351.04

## 2021-10-14 ENCOUNTER — HOSPITAL ENCOUNTER (OUTPATIENT)
Dept: PHYSICAL THERAPY | Age: 81
Setting detail: THERAPIES SERIES
Discharge: HOME | End: 2021-10-14
Payer: MEDICARE

## 2021-10-14 PROCEDURE — 97012 MECHANICAL TRACTION THERAPY: CPT

## 2021-10-14 PROCEDURE — 97110 THERAPEUTIC EXERCISES: CPT

## 2021-10-14 PROCEDURE — 97140 MANUAL THERAPY 1/> REGIONS: CPT

## 2021-10-14 ASSESSMENT — PAIN SCALES - GENERAL: PAINLEVEL_OUTOF10: 4

## 2021-10-14 ASSESSMENT — PAIN DESCRIPTION - LOCATION: LOCATION: BACK;NECK

## 2021-10-14 NOTE — PROGRESS NOTES
Phone: [address]      Fax: [phone]                            Outpatient Physical Therapy                                                                            Daily Note    Date: 10/14/2021  Patient Name: Jessee Gamble        MRN: [id number]   ACCT#:  [de-identified]  : 1940  (79 y.o.)    Referring Practitioner: Dr. Marcelo Blevins    Referral Date : 09/15/21    Diagnosis: Cervicalgia  Treatment Diagnosis: Neck pain    Onset Date: 09/15/21 (Referral)  PT Insurance Information: [facility], [employer]  Total # of Visits Approved: 10 Per Physician Order  Total # of Visits to Date: 6  Plan of Care/Certification Expiration Date: 10/29/21    Pre-Treatment Pain:  4/10     Assessment  Assessment: Pain 4/10 across upper back and neck. Completed therex per Doc Flow. Manual therapy to upper back and trap muscles to decrease tighness and prepare for traction. Mechanical cervical traction per Doc Flow with HP. Cervical AROM forward flexion 30 degrees, rotation 30 degrees bilaterally. End of session pain less 2/10. Jane Serna Chart Reviewed: Yes    Plan  Plan: Continue with current plan    Exercises/Modalities/Manual:  See DocFlow Sheet    Education:         Goals  (Total # of Visits to Date: 6)   Short Term Goals - Time Frame for Short term goals: 7  Short term goal 1: Patient to be independent with HEP  Short term goal 2: Decrease pain 4/10 neck and shld at worst x3 days  Short term goal 3: Increase cervical ROM forward flexion 40 degrees  Short term goal 4:  Increase cervical ROM rotation 45 degrees bilaterally       Long Term Goals - Time Frame for Long term goals : 10  Long term goal 1: Decrease pain 2/10 neck and shld at worst x3 days  Long term goal 2: Improve functional activities with Neck disability Index score <10/50  Long term goal 3: Patient to reports decrease dizziness/ vertigo by 50%          Post Treatment Pain:  2/10    Time In: 14:31    Time Out : 15:15        Timed Code Treatment Minutes: 30 Minutes  Total Treatment Time: 1430 Claire Mc, PT     Date: 10/14/2021

## 2021-10-14 NOTE — PRE-CERTIFICATION NOTE
Medicare Cap     [] Physical Therapy  [] Speech Therapy  [] Occupational therapy    *PT and Speech caps combine      $2590 Cap limit < kx modifier needed < $9622 requires pre-cert     Patient Name: Clarendon Hillsweston Wick  YOB: 1940     Date of M[address] Name $$$ charge Daily Charge YTD   Total $   9/20/21 Eval, Ex 117.78 117.78 117.78   9/27/21 Man, x 2, Ex 24.76 x 2, 26.88 76.40 194.18   9/29/21 Ex x 1, man x 2 24.76 x 2, 26.88 76.40 270.58   10/4/21 Ex x 1 , tx 26.88, 13.35 40.23 310.81   10/7/21 Ex x1 traction x1 26.88, 13.35 40.23 351.04   10/14/21 Ex x1, man x1, traction x1

## 2021-10-14 NOTE — PRE-CERTIFICATION NOTE
Medicare Cap     [] Physical Therapy  [] Speech Therapy  [] Occupational therapy    *PT and Speech caps combine      $1426 Cap limit < kx modifier needed < $8508 requires pre-cert     Patient Name: Lorelei Chen  YOB: 1940     Date of M[address] Name $$$ charge Daily Charge YTD   Total $   9/20/21 Eval, Ex 117.78 117.78 117.78   9/27/21 Man, x 2, Ex 24.76 x 2, 26.88 76.40 194.18   9/29/21 Ex x 1, man x 2 24.76 x 2, 26.88 76.40 270.58   10/4/21 Ex x 1 , tx 26.88, 13.35 40.23 310.81   10/7/21 Ex x1 traction x1 26.88, 13.35 40.23 351.04   10/14/21 Ex x1, man x1, traction x1 26.88, 13.35, 24.76 64.99 416.03

## 2021-10-18 ENCOUNTER — HOSPITAL ENCOUNTER (OUTPATIENT)
Dept: PHYSICAL THERAPY | Age: 81
Setting detail: THERAPIES SERIES
Discharge: HOME | End: 2021-10-18
Payer: MEDICARE

## 2021-10-18 PROCEDURE — 97140 MANUAL THERAPY 1/> REGIONS: CPT

## 2021-10-18 PROCEDURE — 97012 MECHANICAL TRACTION THERAPY: CPT

## 2021-10-18 PROCEDURE — 97110 THERAPEUTIC EXERCISES: CPT

## 2021-10-18 ASSESSMENT — PAIN SCALES - GENERAL: PAINLEVEL_OUTOF10: 3

## 2021-10-18 NOTE — PRE-CERTIFICATION NOTE
Medicare Cap     [] Physical Therapy  [] Speech Therapy  [] Occupational therapy    *PT and Speech caps combine      $2821 Cap limit < kx modifier needed < $0645 requires pre-cert     Patient Name: Jhon Scott  YOB: 1940     Date of M[address] Name $$$ charge Daily Charge YTD   Total $   9/20/21 Eval, Ex 117.78 117.78 117.78   9/27/21 Man, x 2, Ex 24.76 x 2, 26.88 76.40 194.18   9/29/21 Ex x 1, man x 2 24.76 x 2, 26.88 76.40 270.58   10/4/21 Ex x 1 , tx 26.88, 13.35 40.23 310.81   10/7/21 Ex x1 traction x1 26.88, 13.35 40.23 351.04   10/14/21 Ex x1, man x1, traction x1 26.88, 13.35, 24.76 64.99 416.03   10/18/21 Ex x1, man x1, tx x 1

## 2021-10-18 NOTE — PROGRESS NOTES
Phone: [address]      Fax: [phone]                            Outpatient Physical Therapy                                                                            Daily Note    Date: 10/18/2021  Patient Name: Adam Rivera        MRN: [id number]   ACCT#:  [de-identified]  : 1940  (79 y.o.)    Referring Practitioner: Dr. Noble Cheney    Referral Date : 09/15/21    Diagnosis: Cervicalgia  Treatment Diagnosis: Neck pain    Onset Date: 09/15/21 (Referral)  PT Insurance Information: [facility], [employer]  Total # of Visits Approved: 10 Per Physician Order  Total # of Visits to Date: 7  No Show: 0  Canceled Appointment: 0  Plan of Care/Certification Expiration Date: 10/29/21    Pre-Treatment Pain:  3-4/10     Assessment  Assessment: Rates pain today as 3-4/10. Also c/o stiffness in her neck. Continued with exercises follwed by manual therapy and mechanical cervical traction. Reports being less stiff following. .  Chart Reviewed: Yes    Plan  Plan: Continue with current plan    Exercises/Modalities/Manual:  See DocFlow Sheet    Education:           Goals  (Total # of Visits to Date: 7)   Short Term Goals - Time Frame for Short term goals: 7  Short term goal 1: Patient to be independent with HEP  Short term goal 2: Decrease pain 4/10 neck and shld at worst x3 days  Short term goal 3: Increase cervical ROM forward flexion 40 degrees  Short term goal 4:  Increase cervical ROM rotation 45 degrees bilaterally       Long Term Goals - Time Frame for Long term goals : 10  Long term goal 1: Decrease pain 2/10 neck and shld at worst x3 days  Long term goal 2: Improve functional activities with Neck disability Index score <10/50  Long term goal 3: Patient to reports decrease dizziness/ vertigo by 50%          Post Treatment Pain:  2-310    Time In: 1331   Time Out : 1412        Timed Code Treatment Minutes: 31 Minutes  Total Treatment Time: 135 Cait Starkey PTA     Date: 10/18/2021

## 2021-10-20 ENCOUNTER — HOSPITAL ENCOUNTER (OUTPATIENT)
Dept: PHYSICAL THERAPY | Age: 81
Setting detail: THERAPIES SERIES
Discharge: HOME | End: 2021-10-20
Payer: MEDICARE

## 2021-10-20 PROCEDURE — 97140 MANUAL THERAPY 1/> REGIONS: CPT

## 2021-10-20 PROCEDURE — 97110 THERAPEUTIC EXERCISES: CPT

## 2021-10-20 PROCEDURE — 97012 MECHANICAL TRACTION THERAPY: CPT

## 2021-10-20 ASSESSMENT — PAIN SCALES - GENERAL: PAINLEVEL_OUTOF10: 3

## 2021-10-20 NOTE — PROGRESS NOTES
Phone: [address]      Fax: [phone]                            Outpatient Physical Therapy                                                                            Daily Note    Date: 10/20/2021  Patient Name: Keyla Orellana        MRN: [id number]   ACCT#:  [de-identified]  : 1940  (79 y.o.)    Referring Practitioner: Dr. Juarez Perla    Referral Date : 09/15/21    Diagnosis: Cervicalgia  Treatment Diagnosis: Neck pain    Onset Date: 09/15/21 (Referral)  PT Insurance Information: [facility], [employer]  Total # of Visits Approved: 10 Per Physician Order  Total # of Visits to Date: 8  No Show: 0  Canceled Appointment: 0  Plan of Care/Certification Expiration Date: 10/29/21    Pre-Treatment Pain:  3/10     Assessment  Assessment: Pain rated 3/10 upon arrival to therapy per pt. Manual therapy completed  to address tightness in back and traps muscles and traction for decompression. Pt reports completing HEP daily with good understanding. Pt feels she has had 3 days where pain has been 4/10 or below and notes even on a bad day pain does not elevate to as high since beginning therapy sessions. Pt had appointment at cardiologist yesterday having a change in her medication which the feel may be the culprit of her dizzness. Pt issued neck disability form to fill out at home with daughter and return next visit. Pt has 2 more scheduled visits then plan to discharge. Chart Reviewed: Yes    Plan  Plan: Continue with current plan    Exercises/Modalities/Manual:  See DocFlow Sheet    Education:           Goals  (Total # of Visits to Date: 8)   Short Term Goals - Time Frame for Short term goals: 7  Short term goal 1: Patient to be independent with HEP Met  Short term goal 2: Decrease pain 4/10 neck and shld at worst x3 days Met  Short term goal 3: Increase cervical ROM forward flexion 40 degrees  Short term goal 4:  Increase cervical ROM rotation 45 degrees bilaterally       Long Term Goals - Time Frame for Long term goals : 10  Long term goal 1: Decrease pain 2/10 neck and shld at worst x3 days  Long term goal 2: Improve functional activities with Neck disability Index score <10/50  Long term goal 3: Patient to reports decrease dizziness/ vertigo by 50%          Post Treatment Pain:  1/10      Time In: 1245  Time Out: 1333  Timed Code Treatment Minutes: 38 Minutes  Total Treatment Time: 50 Minutes    Anastasia Garza PTA     Date: 10/20/2021

## 2021-10-20 NOTE — PRE-CERTIFICATION NOTE
Medicare Cap     [] Physical Therapy  [] Speech Therapy  [] Occupational therapy    *PT and Speech caps combine      $7071 Cap limit < kx modifier needed < $8195 requires pre-cert     Patient Name: Memo Gloria  YOB: 1940     Date of M[address] Name $$$ charge Daily Charge YTD   Total $   9/20/21 Eval, Ex 117.78 117.78 117.78   9/27/21 Man, x 2, Ex 24.76 x 2, 26.88 76.40 194.18   9/29/21 Ex x 1, man x 2 24.76 x 2, 26.88 76.40 270.58   10/4/21 Ex x 1 , tx 26.88, 13.35 40.23 310.81   10/7/21 Ex x1 traction x1 26.88, 13.35 40.23 351.04   10/14/21 Ex x1, man x1, traction x1 26.88, 13.35, 24.76 64.99 416.03   10/18/21 Ex x1, man x1, tx x 1      10/20/21 Ex x1 man x1 traction x1

## 2021-10-20 NOTE — PRE-CERTIFICATION NOTE
Medicare Cap     [] Physical Therapy  [] Speech Therapy  [] Occupational therapy    *PT and Speech caps combine      $9744 Cap limit < kx modifier needed < $9412 requires pre-cert     Patient Name: Lorelei Chen  YOB: 1940     Date of M[address] Name $$$ charge Daily Charge YTD   Total $   9/20/21 Angelicaal, Ex 117.78 117.78 117.78   9/27/21 Man, x 2, Ex 24.76 x 2, 26.88 76.40 194.18   9/29/21 Ex x 1, man x 2 24.76 x 2, 26.88 76.40 270.58   10/4/21 Ex x 1 , tx 26.88, 13.35 40.23 310.81   10/7/21 Ex x1 traction x1 26.88, 13.35 40.23 351.04   10/14/21 Ex x1, man x1, traction x1 26.88, 13.35, 24.76 64.99 416.03   10/18/21 Ex x1, man x1, tx x 1 26.88, 13.35, 24.76 64.99 481.02   10/20/21 Ex x1 man x1 traction x1 26.88, 13.35, 24.76 64.99 546.82

## 2021-10-25 ENCOUNTER — HOSPITAL ENCOUNTER (OUTPATIENT)
Dept: PHYSICAL THERAPY | Age: 81
Setting detail: THERAPIES SERIES
Discharge: HOME | End: 2021-10-25
Payer: MEDICARE

## 2021-10-25 PROCEDURE — 97110 THERAPEUTIC EXERCISES: CPT

## 2021-10-25 PROCEDURE — 97140 MANUAL THERAPY 1/> REGIONS: CPT

## 2021-10-25 ASSESSMENT — PAIN SCALES - GENERAL: PAINLEVEL_OUTOF10: 3

## 2021-10-25 NOTE — PROGRESS NOTES
Phone: [address]      Fax: [phone]                            Outpatient Physical Therapy                                                                            Daily Note    Date: 10/25/2021  Patient Name: Keyla Orellana        MRN: [id number]   ACCT#:  [de-identified]  : 1940  (79 y.o.)    Referring Practitioner: Dr. Juarez Perla    Referral Date : 09/15/21    Diagnosis: Cervicalgia  Treatment Diagnosis: Neck pain    Onset Date: 09/15/21 (Referral)  PT Insurance Information: [facility], [employer]  Total # of Visits Approved: 10 Per Physician Order  Total # of Visits to Date: 9  No Show: 0  Canceled Appointment: 0  Plan of Care/Certification Expiration Date: 10/29/21    Pre-Treatment Pain:  3/10     Assessment  Assessment: Patient arrived reporting not feeling just right. She has not felt better for about 3 weeks and continues to be under doctors care and testing. Neck feels good 2-3 days then stiffins back up. Pain can reach 4-5/10 and treats with heat and tylenol. Continued with exercise for strengthening, manual for tight muscle and traction for decompression. Increased time of pull to 12 minutes. Pt schedued 1 more visit then plan to discharge. Chart Reviewed: Yes    Plan  Plan: Continue with current plan    Exercises/Modalities/Manual:  See DocFlow Sheet    Education:           Goals  (Total # of Visits to Date: 5)   Short Term Goals - Time Frame for Short term goals: 7  Short term goal 1: Patient to be independent with HEP Met  Short term goal 2: Decrease pain 4/10 neck and shld at worst x3 days Met  Short term goal 3: Increase cervical ROM forward flexion 40 degrees  Short term goal 4:  Increase cervical ROM rotation 45 degrees bilaterally       Long Term Goals - Time Frame for Long term goals : 10  Long term goal 1: Decrease pain 2/10 neck and shld at worst x3 days  Long term goal 2: Improve functional activities with Neck disability Index score <10/50  Long term goal 3: Patient to reports decrease dizziness/ vertigo by 50%          Post Treatment Pain:  1/10      Time In: 1249  Time Out: 1331  Timed Code Treatment Minutes: 38 Minutes  Total Treatment Time: 50 Minutes    Hillary Rubio PTA     Date: 10/25/2021

## 2021-10-25 NOTE — PRE-CERTIFICATION NOTE
Medicare Cap     [] Physical Therapy  [] Speech Therapy  [] Occupational therapy    *PT and Speech caps combine      $7556 Cap limit < kx modifier needed < $1312 requires pre-cert     Patient Name: Justen Maravilla  YOB: 1940     Date of M[address] 63 Name $$$ charge Daily Charge YTD   Total $   9/20/21 Angelicaal, Ex 117.78 117.78 117.78   9/27/21 Man, x 2, Ex 24.76 x 2, 26.88 76.40 194.18   9/29/21 Ex x 1, man x 2 24.76 x 2, 26.88 76.40 270.58   10/4/21 Ex x 1 , tx 26.88, 13.35 40.23 310.81   10/7/21 Ex x1 traction x1 26.88, 13.35 40.23 351.04   10/14/21 Ex x1, man x1, traction x1 26.88, 13.35, 24.76 64.99 416.03   10/18/21 Ex x1, man x1, tx x 1 26.88, 13.35, 24.76 64.99 481.02   10/20/21 Ex x1 man x1 traction x1 26.88, 13.35, 24.76 64.99 546.82   10/25/21 Ex x 2, man 26.88 X 2, 24.76 78.52 625.34

## 2021-10-29 ENCOUNTER — HOSPITAL ENCOUNTER (OUTPATIENT)
Dept: PHYSICAL THERAPY | Age: 81
Setting detail: THERAPIES SERIES
Discharge: HOME | End: 2021-10-29
Payer: MEDICARE

## 2021-10-29 PROCEDURE — 97110 THERAPEUTIC EXERCISES: CPT

## 2021-10-29 PROCEDURE — 97012 MECHANICAL TRACTION THERAPY: CPT

## 2021-10-29 PROCEDURE — 97140 MANUAL THERAPY 1/> REGIONS: CPT

## 2021-10-29 ASSESSMENT — PAIN SCALES - GENERAL: PAINLEVEL_OUTOF10: 3

## 2021-10-29 NOTE — PRE-CERTIFICATION NOTE
Medicare Cap     [] Physical Therapy  [] Speech Therapy  [] Occupational therapy    *PT and Speech caps combine      $1940 Cap limit < kx modifier needed < $5383 requires pre-cert     Patient Name: Adam Rivera  YOB: 1940     Date of [address] Name $$$ charge Daily Charge YTD   Total $   9/20/21 Angelicaal, Ex 117.78 117.78 117.78   9/27/21 Man, x 2, Ex 24.76 x 2, 26.88 76.40 194.18   9/29/21 Ex x 1, man x 2 24.76 x 2, 26.88 76.40 270.58   10/4/21 Ex x 1 , tx 26.88, 13.35 40.23 310.81   10/7/21 Ex x1 traction x1 26.88, 13.35 40.23 351.04   10/14/21 Ex x1, man x1, traction x1 26.88, 13.35, 24.76 64.99 416.03   10/18/21 Ex x1, man x1, tx x 1 26.88, 13.35, 24.76 64.99 481.02   10/20/21 Ex x1 man x1 traction x1 26.88, 13.35, 24.76 64.99 546.82   10/25/21 Ex x 2, man 26.88 X 2, 24.76 78.52 625.34   10/29/21 Ex, man, traction

## 2021-10-29 NOTE — PROGRESS NOTES
Phone: Rebecca Palm      Fax: [phone]                            Outpatient Physical Therapy                                                                            Daily Note    Date: 10/29/2021  Patient Name: Shabbir Pop        MRN: [id number]   ACCT#:  [de-identified]  : 1940  (79 y.o.)    Referring Practitioner: Dr. Gayle Young    Referral Date : 09/15/21    Diagnosis: Cervicalgia  Treatment Diagnosis: Neck pain    Onset Date: 09/15/21 (Referral)  PT Insurance Information: [facility], [employer]  Total # of Visits Approved: 10 Per Physician Order  Total # of Visits to Date: 10  No Show: 0  Canceled Appointment: 0  Plan of Care/Certification Expiration Date: 10/29/21    Pre-Treatment Pain:  3/10     Assessment  Assessment: Patient rates pain today as 3/10. However, yesterday she states that pain was 5-6/10. Continued with exercises as outlined above with fair tolerance. Manual therapy for tight muscle and traction for decompression. Patient scores 22/50 on Neck Disability Index. Also states that dizziness Harshad Leach has decreased just about 50% as she had a few symptoms this morning. Per PT plan, will discharge patient at this time. Chart Reviewed: Yes    Plan  Plan: Continue with current plan    Exercises/Modalities/Manual:  See DocFlow Sheet    Education:           Goals  (Total # of Visits to Date: 10)   Short Term Goals - Time Frame for Short term goals: 7  Short term goal 1: Patient to be independent with HEP Met  Short term goal 2: Decrease pain 4/10 neck and shld at worst x3 days Met  Short term goal 3: Increase cervical ROM forward flexion 40 degrees  Short term goal 4:  Increase cervical ROM rotation 45 degrees bilaterally       Long Term Goals - Time Frame for Long term goals : 10  Long term goal 1: Decrease pain 2/10 neck and shld at worst x3 days-NOT MET  Long term goal 2: Improve functional activities with Neck disability Index score <10/50-MET  Long term goal 3: Patient to reports decrease dizziness/ vertigo by 50%-MET          Post Treatment Pain:  3/10    Time In: 1304  Time Out : 1348        Timed Code Treatment Minutes: 32 Minutes  Total Treatment Time: 78[facility]     Date: 10/29/2021